# Patient Record
Sex: FEMALE | Race: WHITE | Employment: FULL TIME | URBAN - METROPOLITAN AREA
[De-identification: names, ages, dates, MRNs, and addresses within clinical notes are randomized per-mention and may not be internally consistent; named-entity substitution may affect disease eponyms.]

---

## 2023-06-07 ENCOUNTER — APPOINTMENT (OUTPATIENT)
Dept: GENERAL RADIOLOGY | Age: 48
End: 2023-06-07
Payer: COMMERCIAL

## 2023-06-07 ENCOUNTER — HOSPITAL ENCOUNTER (EMERGENCY)
Age: 48
Discharge: HOME | End: 2023-06-07
Attending: EMERGENCY MEDICINE
Payer: COMMERCIAL

## 2023-06-07 VITALS
HEIGHT: 66 IN | DIASTOLIC BLOOD PRESSURE: 98 MMHG | TEMPERATURE: 97.7 F | HEART RATE: 79 BPM | OXYGEN SATURATION: 99 % | BODY MASS INDEX: 38.57 KG/M2 | SYSTOLIC BLOOD PRESSURE: 169 MMHG | WEIGHT: 240 LBS | RESPIRATION RATE: 18 BRPM

## 2023-06-07 DIAGNOSIS — M25.561 ACUTE PAIN OF RIGHT KNEE: Primary | ICD-10-CM

## 2023-06-07 PROCEDURE — 73562 X-RAY EXAM OF KNEE 3: CPT

## 2023-06-07 RX ORDER — PHENTERMINE HYDROCHLORIDE 37.5 MG/1
37.5 CAPSULE ORAL EVERY MORNING
COMMUNITY

## 2023-06-07 RX ORDER — METHYLPREDNISOLONE ACETATE 40 MG/ML
40 INJECTION, SUSPENSION INTRA-ARTICULAR; INTRALESIONAL; INTRAMUSCULAR; SOFT TISSUE ONCE
Status: DISCONTINUED | OUTPATIENT
Start: 2023-06-07 | End: 2023-06-07 | Stop reason: HOSPADM

## 2023-06-07 ASSESSMENT — PAIN SCALES - GENERAL: PAINLEVEL_OUTOF10: 7

## 2023-06-07 ASSESSMENT — PAIN - FUNCTIONAL ASSESSMENT: PAIN_FUNCTIONAL_ASSESSMENT: 0-10

## 2023-06-07 ASSESSMENT — PAIN DESCRIPTION - LOCATION: LOCATION: KNEE

## 2023-06-07 ASSESSMENT — PAIN DESCRIPTION - ORIENTATION: ORIENTATION: RIGHT

## 2023-06-07 NOTE — ED PROVIDER NOTES
Emergency Department Encounter    Patient: Jessica Dietz  MRN: [id number]  : 1975  Date of Evaluation: 2023  ED Provider:  Hill Cui MD    MDM:    Clinical Impression:  No diagnosis found. Triage Chief Complaint: Knee Pain      Patient presents with right knee pain as below. I completed a structured, evidence-based clinical evaluation to screen for acute emergent condition that poses a threat to life or bodily function. Diagnostic studies/Differential diagnosis included: (with independent interpretations \"as interpreted by me\" and tests considered but not performed) patient has a benign exam.  Extensor mechanism is intact with no evidence of patellar tendon injury. Patient is neurovascularly intact. No evidence of ligamentous instability including the ACL, PCL, medial or lateral collateral ligaments. No evidence of infectious emergency. I do not suspect DVT in this patient. Patient will be evaluated with right knee x-ray. Imaging is pending. Patient will be signed out to oncoming provider at shift change for follow-up of pending studies and appropriate disposition. Declined any pain medication in the emergency department. Medications ordered in the ED:  ED Medication Orders (From admission, onward)      None                PLAN  Disposition: Patient will be signed out to oncoming provider at shift change for follow-up of pending studies and appropriate disposition. Disposition referral (if applicable):  No follow-up provider specified. Medications prescribed (if applicable):  New Prescriptions    No medications on file         ===================================================================    HPI/Pertinent ROS:  Jessica Dietz is a 51 y.o. female that presents complaining of 4-day history of right knee pain.   Patient states that she began having knee pain 5 days prior to presentation however 4 days prior to presentation as she was walking to a restaurant, she

## 2023-06-07 NOTE — ED PROVIDER NOTES
CARE RECEIVED FROM: Dr. Nancie Haddad  I reviewed the mathias elements of the history, physical exam and initial treatment plan at the bedside. ANCILLARY DATA:  I reviewed the images. Radiologist interpretation:   XR KNEE RIGHT (3 VIEWS)    (Results Pending)     Procedure:   Intra-articular joint injection procedure Note    Indication: Joint pain and joint swelling    Consent: The patient was counseled regarding the procedure, it's indications, risks, potential complications and alternatives and any questions were answered. Consent was obtained. Procedure: The right knee was positioned appropriately and the landmarks were identified. Local anesthesia was obtained by infiltration using 0.5% Bupivacaine without epinephrine. The area was then prepped and draped in the usual sterile fashion. A needle was then introduced into the joint space at which point a joint injection was performed using 1.0 cc of Depomedrol 40 mg.  A bandage was then applied to the site. The patient tolerated the procedure well. Complications: None      Labs Reviewed - No data to display  MEDICAL DECISION MAKING / PLAN:  This is a 50-year-old female that presents emergency department with complaints of right knee pain over the last 4 days. She apparently is a history of some knee pain over the last 9 to 10 days that began acutely worse 4 days ago when her knee buckled while walking in a restaurant. No direct trauma to the knee. On exam she had mild reproducible tenderness to inferomedial aspect of the right knee. Right lower extremity is neurovascularly intact her previous provider and x-rays right knee is ordered and pending at time of signout. X-ray of the right knee shows suprapatellar joint effusion and degenerative changes of the knee. On reassessment she has no overlying skin changes of the right knee with full range of motion to flexion and extension. She has no pain with axial loading.   At this time patient was offered

## 2023-06-08 NOTE — ED NOTES
Pt verbalized understanding of discharge instructions and follow-up care, denies any questions or concerns at this time. No new prescriptions provided; pt encouraged to return to the ED for any new or worsening symptoms.      Patel Nuñez RN  06/07/23 2001